# Patient Record
Sex: MALE | Race: WHITE | URBAN - METROPOLITAN AREA
[De-identification: names, ages, dates, MRNs, and addresses within clinical notes are randomized per-mention and may not be internally consistent; named-entity substitution may affect disease eponyms.]

---

## 2020-12-15 ENCOUNTER — WEB ENCOUNTER (OUTPATIENT)
Dept: URBAN - METROPOLITAN AREA CLINIC 40 | Facility: CLINIC | Age: 60
End: 2020-12-15

## 2020-12-15 ENCOUNTER — OFFICE VISIT (OUTPATIENT)
Dept: URBAN - METROPOLITAN AREA CLINIC 40 | Facility: CLINIC | Age: 60
End: 2020-12-15
Payer: COMMERCIAL

## 2020-12-15 DIAGNOSIS — Z79.899 LONG TERM CURRENT USE OF IMMUNOSUPPRESSIVE DRUG: ICD-10-CM

## 2020-12-15 DIAGNOSIS — K50.80 CROHN'S DISEASE OF BOTH SMALL AND LARGE INTESTINE: ICD-10-CM

## 2020-12-15 PROCEDURE — 99243 OFF/OP CNSLTJ NEW/EST LOW 30: CPT | Performed by: INTERNAL MEDICINE

## 2020-12-15 PROCEDURE — G8482 FLU IMMUNIZE ORDER/ADMIN: HCPCS | Performed by: INTERNAL MEDICINE

## 2020-12-15 NOTE — HPI-TODAY'S VISIT:
is a 60-year-old white male seen in consultation at the request of Dr. Shimon Lynn to establish care for Crohn's disease.  Patient was previously followed by Dr. Amy Maynard in [place].  Patient states he was initially diagnosed in 2018.  Initial presentation included rectal bleeding, diarrhea and malaise.  He had a colonoscopy which provided a diagnosis.  He states he specifically had ileal disease.  He was started on sulfasalazine as well as antibiotics.  When he did not improve 3 to 4 weeks later he was started on mesalamine.  Patient noted minor improvement but he was still was having abdominal cramps and occasional bleeding.  February 2020 Dr. Maynard started him on Humira which did give him significant improvement until July when he noticed that the medicine appeared to stop working.  He noted diarrhea as well as urgency.  He saw the nurse practitioner for Dr. Maynard who did stool testing which was negative for infection.  She then started him on steroids and added mesalamine back to his Humira regimen.  He states starting the 60 mg taper did help and the bleeding resolved.  1 week off the steroids he noted significant decrease in appetite and actually lost 7 pounds over 2-week.  He subsequently started to have issues with erythema in the perianal area as well as burning and itching.  He thinks he saw 2 small fistulas that were draining as well.  States he try to get in contact with the office but did not hear back.  He was using topical therapy including hydrocortisone cream.  Around Thanksgiving though symptoms improved.  Currently he is doing fairly well moving his bowels once a day.  He is on Humira as well as Imodium.  He denies any abdominal pain or rectal bleeding.  He denies any nausea.  He denies any extraintestinal manifestations such as mouth ulcers or rash.  He had joint pain prior to starting Humira but he states that takes care of that as well.

## 2020-12-26 LAB
A/G RATIO: 1.7
ADALIMUMAB DRUG LEVEL: 8.1
ALBUMIN: 4.5
ALKALINE PHOSPHATASE: 87
ALT (SGPT): 11
ANTI-ADALIMUMAB ANTIBODY: <25
AST (SGOT): 16
BASO (ABSOLUTE): 0.1
BASOS: 1
BILIRUBIN, TOTAL: 0.6
BUN/CREATININE RATIO: 9
BUN: 10
C-REACTIVE PROTEIN, QUANT: 1
CALCIUM: 9.9
CARBON DIOXIDE, TOTAL: 26
CHLORIDE: 97
CREATININE: 1.09
EGFR IF AFRICN AM: 85
EGFR IF NONAFRICN AM: 74
EOS (ABSOLUTE): 0.2
EOS: 4
GLOBULIN, TOTAL: 2.6
GLUCOSE: 99
HEMATOCRIT: 46.1
HEMATOLOGY COMMENTS:: (no result)
HEMOGLOBIN: 15.4
IMMATURE CELLS: (no result)
IMMATURE GRANS (ABS): 0
IMMATURE GRANULOCYTES: 0
LYMPHS (ABSOLUTE): 1.8
LYMPHS: 35
MCH: 30.5
MCHC: 33.4
MCV: 91
MONOCYTES(ABSOLUTE): 0.5
MONOCYTES: 10
NEUTROPHILS (ABSOLUTE): 2.6
NEUTROPHILS: 50
NRBC: (no result)
PLATELETS: 238
POTASSIUM: 3.9
PROTEIN, TOTAL: 7.1
RBC: 5.05
RDW: 11.4
SODIUM: 137
WBC: 5.2

## 2021-01-26 ENCOUNTER — OFFICE VISIT (OUTPATIENT)
Dept: URBAN - METROPOLITAN AREA CLINIC 40 | Facility: CLINIC | Age: 61
End: 2021-01-26
Payer: COMMERCIAL

## 2021-01-26 DIAGNOSIS — Z79.899 LONG TERM CURRENT USE OF IMMUNOSUPPRESSIVE DRUG: ICD-10-CM

## 2021-01-26 DIAGNOSIS — K50.80 CROHN'S DISEASE OF BOTH SMALL AND LARGE INTESTINE: ICD-10-CM

## 2021-01-26 PROCEDURE — G8420 CALC BMI NORM PARAMETERS: HCPCS | Performed by: INTERNAL MEDICINE

## 2021-01-26 PROCEDURE — G8427 DOCREV CUR MEDS BY ELIG CLIN: HCPCS | Performed by: INTERNAL MEDICINE

## 2021-01-26 PROCEDURE — 99213 OFFICE O/P EST LOW 20 MIN: CPT | Performed by: INTERNAL MEDICINE

## 2021-01-26 PROCEDURE — G8482 FLU IMMUNIZE ORDER/ADMIN: HCPCS | Performed by: INTERNAL MEDICINE

## 2021-01-26 PROCEDURE — G9903 PT SCRN TBCO ID AS NON USER: HCPCS | Performed by: INTERNAL MEDICINE

## 2021-01-26 PROCEDURE — 3017F COLORECTAL CA SCREEN DOC REV: CPT | Performed by: INTERNAL MEDICINE

## 2021-01-26 NOTE — HPI-TODAY'S VISIT:
Mr. Herman presents to clinic for follow-up.  Recall he was seen on December 15 to establish care for his Crohn's disease.  He was first symptomatic in 2018 but not formally diagnosed until 2019.  We did get a release of information for his records from Dr. Maynard.  20 minutes were spent today in review of these.  This showed that he presented with rectal bleeding diarrhea and abdominal pain.  He had an initial colonoscopy March 2018.  The IC valve showed a polypoid masslike appearance.  Biopsy showed focal ulceration.  Internal hemorrhoids were also noted.  IBD serology in March 2018 showed a positive Saccharomyces consistent with Crohn's.  He had an upper GI series with small bowel follow-through in March 2018 that showed some esophageal dysmotility but the rest of his bowels were okay.  Repeat colonoscopy in October 2019 showed thickening of the IC valve and stricturing.  He was then started on Humira in January 2020.  He had low levels of antibody to Humira in August 2020 but adequate trough level.  We checked labs at his last appointment which shows no antibodies to Humira and a trough level of 8.  Patient continues to do very well on Humira as well as Imodium daily.  He has 1 soft stool a day.  There is no rectal bleeding.  He has no abdominal pain.  He denies any nausea or vomiting.  He denies any extraintestinal manifestations such as mouth ulcers or sores rash or visual changes.  No perianal inflammation or discomfort like what he had at the end of last year either.

## 2021-03-25 ENCOUNTER — WEB ENCOUNTER (OUTPATIENT)
Dept: URBAN - METROPOLITAN AREA CLINIC 40 | Facility: CLINIC | Age: 61
End: 2021-03-25

## 2021-03-28 ENCOUNTER — WEB ENCOUNTER (OUTPATIENT)
Dept: URBAN - METROPOLITAN AREA CLINIC 40 | Facility: CLINIC | Age: 61
End: 2021-03-28

## 2021-04-05 ENCOUNTER — WEB ENCOUNTER (OUTPATIENT)
Dept: URBAN - METROPOLITAN AREA CLINIC 40 | Facility: CLINIC | Age: 61
End: 2021-04-05

## 2021-04-05 RX ORDER — MESALAMINE 1.2 G/1
4 TABLETS TABLET, DELAYED RELEASE ORAL ONCE A DAY
Qty: 360 TABLET | Refills: 0
End: 2021-07-05

## 2021-04-21 ENCOUNTER — WEB ENCOUNTER (OUTPATIENT)
Dept: URBAN - METROPOLITAN AREA CLINIC 40 | Facility: CLINIC | Age: 61
End: 2021-04-21

## 2021-04-21 RX ORDER — ADALIMUMAB 40MG/0.4ML
1 SUBQ  Q 2 WEEKS KIT SUBCUTANEOUS EVERY 2 WEEKS
Qty: 6 PRE-FILLED SYRINGE | Refills: 0 | OUTPATIENT
Start: 2021-04-23 | End: 2021-07-22

## 2021-04-27 ENCOUNTER — OFFICE VISIT (OUTPATIENT)
Dept: URBAN - METROPOLITAN AREA CLINIC 40 | Facility: CLINIC | Age: 61
End: 2021-04-27
Payer: COMMERCIAL

## 2021-04-27 VITALS
WEIGHT: 131 LBS | HEIGHT: 70 IN | HEART RATE: 72 BPM | DIASTOLIC BLOOD PRESSURE: 94 MMHG | TEMPERATURE: 98.3 F | BODY MASS INDEX: 18.75 KG/M2 | SYSTOLIC BLOOD PRESSURE: 151 MMHG

## 2021-04-27 DIAGNOSIS — Z79.899 LONG TERM CURRENT USE OF IMMUNOSUPPRESSIVE DRUG: ICD-10-CM

## 2021-04-27 DIAGNOSIS — K50.80 CROHN'S DISEASE OF BOTH SMALL AND LARGE INTESTINE: ICD-10-CM

## 2021-04-27 PROCEDURE — 99213 OFFICE O/P EST LOW 20 MIN: CPT | Performed by: INTERNAL MEDICINE

## 2021-04-27 RX ORDER — ADALIMUMAB 40MG/0.4ML
1 SUBQ  Q 2 WEEKS KIT SUBCUTANEOUS EVERY 2 WEEKS
Qty: 6 PRE-FILLED SYRINGE | Refills: 0 | Status: ACTIVE | COMMUNITY
Start: 2021-04-23 | End: 2021-07-22

## 2021-04-27 RX ORDER — MESALAMINE 1.2 G/1
4 TABLETS TABLET, DELAYED RELEASE ORAL ONCE A DAY
Qty: 360 TABLET | Refills: 0 | Status: ACTIVE | COMMUNITY
End: 2021-07-05

## 2021-04-27 RX ORDER — ADALIMUMAB 40MG/0.4ML
1 SUBQ  Q 2 WEEKS KIT SUBCUTANEOUS EVERY 2 WEEKS
OUTPATIENT
Start: 2021-04-23 | End: 2021-07-22

## 2021-04-27 NOTE — HPI-TODAY'S VISIT:
Mr. Herman is a 60 year old male who presents to clinic for follow-up.  Recall, last visit 1/26/21 with Dr. Khan for f/u of his Crohn's disease.  He was first symptomatic in 2018 but not formally diagnosed until 2019.  We did get a release of information for his records from Dr. Maynard.  20 minutes were spent today in review of these.  This showed that he presented with rectal bleeding diarrhea and abdominal pain.  He had an initial colonoscopy March 2018.  The IC valve showed a polypoid masslike appearance.  Biopsy showed focal ulceration.  Internal hemorrhoids were also noted.  IBD serology in March 2018 showed a positive Saccharomyces consistent with Crohn's.  He had an upper GI series with small bowel follow-through in March 2018 that showed some esophageal dysmotility but the rest of his bowels were okay.  Repeat colonoscopy in October 2019 showed thickening of the IC valve and stricturing.  He was then started on Humira in January 2020.  He had low levels of antibody to Humira in August 2020 but adequate trough level.  We checked labs at his last appointment which shows no antibodies to Humira and a trough level of 8.  Patient continues to do very well on Humira as well as Imodium daily.  He has 1 soft stool a day.  There is no rectal bleeding.  He has no abdominal pain.  He denies any nausea or vomiting.  He denies any extraintestinal manifestations such as mouth ulcers or sores rash or visual changes.  No perianal inflammation or discomfort like what he had at the end of last year either. No change in GI complaints with occasional borborygmi I.  No diarrhea, constipation or rectal bleeding.  No skin rashes mouth sores or arthralgias as noted with prior visit.  Believes his clothes are fitting a little bit more loose but in the last month or so he has a eliminated refined sugars from his diet.  This was due to him being told that his glucose was elevated.  I did review blood work from end of March where he was noted to have essentially normal CBC, LFTs and thyroid function.  Doing very well on Humira.  No significant abdominal pain at this time.  Good appetite.  He does believe that his occasional anxiety due to prior difficulty with IBD causes his GI symptoms.  Would like to try probiotic as discussed.

## 2021-06-28 ENCOUNTER — WEB ENCOUNTER (OUTPATIENT)
Dept: URBAN - METROPOLITAN AREA CLINIC 40 | Facility: CLINIC | Age: 61
End: 2021-06-28

## 2021-06-28 RX ORDER — DIPHENOXYLATE HYDROCHLORIDE AND ATROPINE SULFATE 2.5; .025 MG/1; MG/1
1 TABLET AS NEEDED TABLET ORAL QD
Qty: 90 | Refills: 0

## 2021-07-05 ENCOUNTER — WEB ENCOUNTER (OUTPATIENT)
Dept: URBAN - METROPOLITAN AREA CLINIC 40 | Facility: CLINIC | Age: 61
End: 2021-07-05

## 2021-07-05 RX ORDER — MESALAMINE 1.2 G/1
TAKE FOUR TABLETS BY MOUTH ONE TIME DAILY TABLET, DELAYED RELEASE ORAL
Qty: 360 TABLET | Refills: 1

## 2021-07-06 ENCOUNTER — ERX REFILL RESPONSE (OUTPATIENT)
Dept: URBAN - METROPOLITAN AREA CLINIC 40 | Facility: CLINIC | Age: 61
End: 2021-07-06

## 2021-07-06 RX ORDER — MESALAMINE 1.2 G/1
TAKE FOUR TABLETS BY MOUTH ONE TIME DAILY TABLET, DELAYED RELEASE ORAL
Qty: 360 TABLET | Refills: 1 | OUTPATIENT

## 2021-07-12 ENCOUNTER — TELEPHONE ENCOUNTER (OUTPATIENT)
Dept: URBAN - METROPOLITAN AREA CLINIC 40 | Facility: CLINIC | Age: 61
End: 2021-07-12

## 2021-07-12 RX ORDER — ADALIMUMAB 40MG/0.4ML
1 SUBQ  Q 2 WEEKS KIT SUBCUTANEOUS EVERY 2 WEEKS
Qty: 2 | Refills: 2

## 2021-07-14 ENCOUNTER — WEB ENCOUNTER (OUTPATIENT)
Dept: URBAN - METROPOLITAN AREA CLINIC 40 | Facility: CLINIC | Age: 61
End: 2021-07-14

## 2021-07-19 ENCOUNTER — WEB ENCOUNTER (OUTPATIENT)
Dept: URBAN - METROPOLITAN AREA CLINIC 40 | Facility: CLINIC | Age: 61
End: 2021-07-19

## 2021-07-19 RX ORDER — ADALIMUMAB 40MG/0.4ML
1 SUBQ  Q 2 WEEKS KIT SUBCUTANEOUS EVERY 2 WEEKS
Qty: 2 | Refills: 2
End: 2021-10-18

## 2021-07-21 ENCOUNTER — TELEPHONE ENCOUNTER (OUTPATIENT)
Dept: URBAN - METROPOLITAN AREA CLINIC 40 | Facility: CLINIC | Age: 61
End: 2021-07-21

## 2021-07-27 ENCOUNTER — OFFICE VISIT (OUTPATIENT)
Dept: URBAN - METROPOLITAN AREA CLINIC 40 | Facility: CLINIC | Age: 61
End: 2021-07-27
Payer: COMMERCIAL

## 2021-07-27 ENCOUNTER — WEB ENCOUNTER (OUTPATIENT)
Dept: URBAN - METROPOLITAN AREA CLINIC 40 | Facility: CLINIC | Age: 61
End: 2021-07-27

## 2021-07-27 VITALS
HEART RATE: 70 BPM | BODY MASS INDEX: 18.04 KG/M2 | HEIGHT: 70 IN | SYSTOLIC BLOOD PRESSURE: 130 MMHG | DIASTOLIC BLOOD PRESSURE: 76 MMHG | TEMPERATURE: 98.1 F | WEIGHT: 126 LBS

## 2021-07-27 DIAGNOSIS — Z79.899 LONG TERM CURRENT USE OF IMMUNOSUPPRESSIVE DRUG: ICD-10-CM

## 2021-07-27 DIAGNOSIS — K50.80 CROHN'S DISEASE OF BOTH SMALL AND LARGE INTESTINE: ICD-10-CM

## 2021-07-27 DIAGNOSIS — R63.4 WEIGHT LOSS: ICD-10-CM

## 2021-07-27 PROCEDURE — 74177 CT ABD & PELVIS W/CONTRAST: CPT | Performed by: INTERNAL MEDICINE

## 2021-07-27 PROCEDURE — 99213 OFFICE O/P EST LOW 20 MIN: CPT | Performed by: INTERNAL MEDICINE

## 2021-07-27 RX ORDER — ADALIMUMAB 40MG/0.4ML
1 SUBQ  Q 2 WEEKS KIT SUBCUTANEOUS EVERY 2 WEEKS
Qty: 2 | Refills: 2 | Status: ACTIVE | COMMUNITY
End: 2021-10-18

## 2021-07-27 RX ORDER — DIPHENOXYLATE HYDROCHLORIDE AND ATROPINE SULFATE 2.5; .025 MG/1; MG/1
1 TABLET AS NEEDED TABLET ORAL QD
Qty: 90 | Refills: 0 | Status: ACTIVE | COMMUNITY

## 2021-07-27 RX ORDER — MESALAMINE 1.2 G/1
TAKE FOUR TABLETS BY MOUTH ONE TIME DAILY TABLET, DELAYED RELEASE ORAL
Qty: 360 TABLET | Refills: 1 | Status: ACTIVE | COMMUNITY

## 2021-09-21 ENCOUNTER — WEB ENCOUNTER (OUTPATIENT)
Dept: URBAN - METROPOLITAN AREA CLINIC 40 | Facility: CLINIC | Age: 61
End: 2021-09-21

## 2021-09-21 RX ORDER — DIPHENOXYLATE HYDROCHLORIDE AND ATROPINE SULFATE 2.5; .025 MG/1; MG/1
1 TABLET AS NEEDED TABLET ORAL QD
Qty: 90 | Refills: 0
End: 2021-12-20

## 2021-09-21 RX ORDER — MESALAMINE 1.2 G/1
TAKE FOUR TABLETS BY MOUTH ONE TIME DAILY TABLET, DELAYED RELEASE ORAL ONCE A DAY
Qty: 360 | Refills: 0

## 2021-10-27 ENCOUNTER — OFFICE VISIT (OUTPATIENT)
Dept: URBAN - METROPOLITAN AREA CLINIC 40 | Facility: CLINIC | Age: 61
End: 2021-10-27

## 2021-10-27 RX ORDER — MESALAMINE 1.2 G/1
TAKE FOUR TABLETS BY MOUTH ONE TIME DAILY TABLET, DELAYED RELEASE ORAL ONCE A DAY
Qty: 360 | Refills: 0 | Status: ACTIVE | COMMUNITY

## 2021-10-27 RX ORDER — DIPHENOXYLATE HYDROCHLORIDE AND ATROPINE SULFATE 2.5; .025 MG/1; MG/1
1 TABLET AS NEEDED TABLET ORAL QD
Qty: 90 | Refills: 0 | Status: ACTIVE | COMMUNITY
End: 2021-12-20

## 2022-01-03 ENCOUNTER — WEB ENCOUNTER (OUTPATIENT)
Dept: URBAN - METROPOLITAN AREA CLINIC 40 | Facility: CLINIC | Age: 62
End: 2022-01-03

## 2022-01-03 RX ORDER — DIPHENOXYLATE HYDROCHLORIDE AND ATROPINE SULFATE 2.5; .025 MG/1; MG/1
1 TABLET AS NEEDED TABLET ORAL
Qty: 90 | Refills: 0

## 2022-01-03 RX ORDER — MESALAMINE 1.2 G/1
TAKE FOUR TABLETS BY MOUTH ONE TIME DAILY TABLET, DELAYED RELEASE ORAL ONCE A DAY
Qty: 120 | Refills: 0

## 2022-01-05 ENCOUNTER — WEB ENCOUNTER (OUTPATIENT)
Dept: URBAN - METROPOLITAN AREA CLINIC 40 | Facility: CLINIC | Age: 62
End: 2022-01-05

## 2022-01-10 ENCOUNTER — WEB ENCOUNTER (OUTPATIENT)
Dept: URBAN - METROPOLITAN AREA CLINIC 40 | Facility: CLINIC | Age: 62
End: 2022-01-10

## 2022-01-10 RX ORDER — MESALAMINE 375 MG/1
4 CAPSULES IN THE MORNING CAPSULE, EXTENDED RELEASE ORAL ONCE A DAY
Qty: 360 CAPSULE | Refills: 0 | OUTPATIENT
Start: 2022-01-20 | End: 2022-04-20

## 2022-02-03 ENCOUNTER — OFFICE VISIT (OUTPATIENT)
Dept: URBAN - METROPOLITAN AREA CLINIC 40 | Facility: CLINIC | Age: 62
End: 2022-02-03
Payer: COMMERCIAL

## 2022-02-03 ENCOUNTER — WEB ENCOUNTER (OUTPATIENT)
Dept: URBAN - METROPOLITAN AREA CLINIC 40 | Facility: CLINIC | Age: 62
End: 2022-02-03

## 2022-02-03 VITALS
HEIGHT: 70 IN | WEIGHT: 125 LBS | SYSTOLIC BLOOD PRESSURE: 116 MMHG | TEMPERATURE: 98.3 F | HEART RATE: 80 BPM | DIASTOLIC BLOOD PRESSURE: 74 MMHG | BODY MASS INDEX: 17.9 KG/M2

## 2022-02-03 DIAGNOSIS — R63.4 WEIGHT LOSS: ICD-10-CM

## 2022-02-03 DIAGNOSIS — Z79.899 LONG TERM CURRENT USE OF IMMUNOSUPPRESSIVE DRUG: ICD-10-CM

## 2022-02-03 DIAGNOSIS — K50.80 CROHN'S DISEASE OF BOTH SMALL AND LARGE INTESTINE: ICD-10-CM

## 2022-02-03 PROCEDURE — 99213 OFFICE O/P EST LOW 20 MIN: CPT | Performed by: PHYSICIAN ASSISTANT

## 2022-02-03 RX ORDER — DIPHENOXYLATE HYDROCHLORIDE AND ATROPINE SULFATE 2.5; .025 MG/1; MG/1
1 TABLET AS NEEDED TABLET ORAL
Qty: 90 | Refills: 0 | Status: ACTIVE | COMMUNITY

## 2022-02-03 RX ORDER — SERTRALINE 25 MG/1
1 TABLET TABLET, FILM COATED ORAL ONCE A DAY
Status: ACTIVE | COMMUNITY

## 2022-02-03 RX ORDER — MESALAMINE 375 MG/1
4 CAPSULES IN THE MORNING CAPSULE, EXTENDED RELEASE ORAL ONCE A DAY
OUTPATIENT
Start: 2022-01-20

## 2022-02-03 RX ORDER — MESALAMINE 375 MG/1
4 CAPSULES IN THE MORNING CAPSULE, EXTENDED RELEASE ORAL ONCE A DAY
Qty: 360 CAPSULE | Refills: 0 | Status: ACTIVE | COMMUNITY
Start: 2022-01-20 | End: 2022-04-20

## 2022-02-03 NOTE — HPI-TODAY'S VISIT:
Mr. Herman is a 61 year old male who presents to clinic for follow-up.  Recall, last visit 7/27/21 for f/u of his Crohn's disease.  He was first symptomatic in 2018 but not formally diagnosed until 2019.  We did get a release of information for his records from Dr. Maynard. He presented with rectal bleeding diarrhea and abdominal pain.  He had an initial colonoscopy March 2018.  The IC valve showed a polypoid masslike appearance.  Biopsy showed focal ulceration.  Internal hemorrhoids were also noted.  IBD serology in March 2018 showed a positive Saccharomyces consistent with Crohn's.  He had an upper GI series with small bowel follow-through in March 2018 that showed some esophageal dysmotility but the rest of his bowels were okay.  Repeat colonoscopy in October 2019 showed thickening of the IC valve and stricturing.  He was then started on Humira in January 2020.  He had low levels of antibody to Humira in August 2020 but adequate trough level.  We checked labs with a prior visit which showed no antibodies to Humira and a trough level of 8.  Patient continues to do very well on Humira as well as Imodium daily.  There is no rectal bleeding.  He has no abdominal pain.  He denies any nausea or vomiting.  He denies any extraintestinal manifestations such as mouth ulcers or sores rash or visual changes.  No perianal inflammation or discomfort like what he had at the end of last year either.  No change in GI complaints with occasional borborygmi. No diarrhea, constipation or rectal bleeding.  Believes his clothes are fitting a little bit more loose but in the last month or so he has a eliminated refined sugars from his diet.  This was due to him being told that his glucose was elevated.  I did review blood work from end of March where he was noted to have essentially normal CBC, LFTs and thyroid function. He does believe that his occasional anxiety due to prior difficulty with IBD causes his GI symptoms.  He returns to the office today with no significant GI complaints in regards to his Crohn's disease. Since his last visit with us in July 2021, he has lost only one pound. He feels much better on the sertraline 50mg daily.  He is back on his mesalamine 4.8 g daily and had his last Humira injection on Wednesday, January 26.  No adverse events to medications.  I do see his recent labs that he shows me on his phone including normal CMP and CBC, thyroid function back in September 2021.  He is eating better.  Denies nausea, vomiting or dysphagia.  No changes in bowel habits since resuming his mesalamine therapy.  Denies rectal bleeding.

## 2022-07-27 ENCOUNTER — DASHBOARD ENCOUNTERS (OUTPATIENT)
Age: 62
End: 2022-07-27

## 2022-07-28 ENCOUNTER — OFFICE VISIT (OUTPATIENT)
Dept: URBAN - METROPOLITAN AREA CLINIC 40 | Facility: CLINIC | Age: 62
End: 2022-07-28

## 2022-07-28 PROBLEM — 71833008 CROHN'S DISEASE OF SMALL AND LARGE INTESTINES: Status: ACTIVE | Noted: 2020-12-15

## 2022-07-28 RX ORDER — DIPHENOXYLATE HYDROCHLORIDE AND ATROPINE SULFATE 2.5; .025 MG/1; MG/1
1 TABLET AS NEEDED TABLET ORAL
Qty: 90 | Refills: 0 | Status: ACTIVE | COMMUNITY

## 2022-07-28 RX ORDER — SERTRALINE 25 MG/1
1 TABLET TABLET, FILM COATED ORAL ONCE A DAY
Status: ACTIVE | COMMUNITY

## 2022-07-28 RX ORDER — MESALAMINE 375 MG/1
4 CAPSULES IN THE MORNING CAPSULE, EXTENDED RELEASE ORAL ONCE A DAY
Status: ACTIVE | COMMUNITY
Start: 2022-01-20

## 2022-07-28 NOTE — HPI-TODAY'S VISIT:
Mr. Herman is a 61 year old male who presents to clinic for follow-up.  Recall, last visit 7/27/21 for f/u of his Crohn's disease.  He was first symptomatic in 2018 but not formally diagnosed until 2019.  We did get a release of information for his records from Dr. Maynard. He presented with rectal bleeding diarrhea and abdominal pain.  He had an initial colonoscopy March 2018.  The IC valve showed a polypoid masslike appearance.  Biopsy showed focal ulceration.  Internal hemorrhoids were also noted.  IBD serology in March 2018 showed a positive Saccharomyces consistent with Crohn's.  He had an upper GI series with small bowel follow-through in March 2018 that showed some esophageal dysmotility but the rest of his bowels were okay.  Repeat colonoscopy in October 2019 showed thickening of the IC valve and stricturing.  He was then started on Humira in January 2020.  He had low levels of antibody to Humira in August 2020 but adequate trough level.  We checked labs with a prior visit which showed no antibodies to Humira and a trough level of 8.  Patient continues to do very well on Humira as well as Imodium daily.  There is no rectal bleeding.  He has no abdominal pain.  He denies any nausea or vomiting.  He denies any extraintestinal manifestations such as mouth ulcers or sores rash or visual changes.  No perianal inflammation or discomfort like what he had at the end of last year either.  No change in GI complaints with occasional borborygmi. No diarrhea, constipation or rectal bleeding.  Believes his clothes are fitting a little bit more loose but in the last month or so he has a eliminated refined sugars from his diet.  This was due to him being told that his glucose was elevated.  I did review blood work from end of March where he was noted to have essentially normal CBC, LFTs and thyroid function. He does believe that his occasional anxiety due to prior difficulty with IBD causes his GI symptoms.  He returns to the office today with no significant GI complaints in regards to his Crohn's disease. Since his last visit with us in July 2021, he has lost only one pound. He feels much better on the sertraline 50mg daily.  He is back on his mesalamine 4.8 g daily. No adverse events to medications.  I do see his recent labs that he shows me on his phone including normal CMP and CBC, thyroid function back in September 2021.  He is eating better.  Denies nausea, vomiting or dysphagia.  No changes in bowel habits since resuming his mesalamine therapy.  Denies rectal bleeding.

## 2023-06-24 NOTE — PHYSICAL EXAM MUSCULOSKELETAL:
normal gait and station , no tenderness or deformities present
Patient needs new urologist due to multiple kidney stones and urinary tract infections. He was discharged today on antibiotics.